# Patient Record
Sex: MALE | URBAN - METROPOLITAN AREA
[De-identification: names, ages, dates, MRNs, and addresses within clinical notes are randomized per-mention and may not be internally consistent; named-entity substitution may affect disease eponyms.]

---

## 2022-10-24 ENCOUNTER — OFFICE VISIT (OUTPATIENT)
Dept: URBAN - METROPOLITAN AREA CLINIC 44 | Facility: CLINIC | Age: 72
End: 2022-10-24
Payer: MEDICARE

## 2022-10-24 DIAGNOSIS — H26.493 OTHER SECONDARY CATARACT, BILATERAL: ICD-10-CM

## 2022-10-24 DIAGNOSIS — D31.31 BENIGN NEOPLASM OF RIGHT CHOROID: ICD-10-CM

## 2022-10-24 DIAGNOSIS — H40.1134 PRIMARY OPEN-ANGLE GLAUCOMA, BILATERAL, INDETERMINATE STAGE: Primary | ICD-10-CM

## 2022-10-24 DIAGNOSIS — H04.123 TEAR FILM INSUFFICIENCY OF BILATERAL LACRIMAL GLANDS: ICD-10-CM

## 2022-10-24 DIAGNOSIS — H43.813 VITREOUS DEGENERATION, BILATERAL: ICD-10-CM

## 2022-10-24 PROCEDURE — 92133 CPTRZD OPH DX IMG PST SGM ON: CPT | Performed by: OPTOMETRIST

## 2022-10-24 PROCEDURE — 99204 OFFICE O/P NEW MOD 45 MIN: CPT | Performed by: OPTOMETRIST

## 2022-10-24 ASSESSMENT — INTRAOCULAR PRESSURE
OD: 16
OS: 20

## 2022-10-24 ASSESSMENT — VISUAL ACUITY
OD: 20/150
OS: 20/20

## 2022-10-24 ASSESSMENT — KERATOMETRY
OS: 42.75
OD: 42.50

## 2022-10-24 NOTE — IMPRESSION/PLAN
Impression: Benign neoplasm of right choroid: D31.31. Appears flat with no suspicious features Plan: PLAN: Observe RTC 4 months for OPTOS

## 2022-10-24 NOTE — IMPRESSION/PLAN
Impression: Primary open-angle glaucoma, bilateral, indeterminate stage: H40.1134. =IOP 16, 20
-Vertical cupping OD Poor scan, OS .74. OS with RNFL loss (S,I,T)  Average OD Poor scan, OS 56,  
-  PACHS  . -Fm Hx.  Plan: PLAN: Cont Combigan but OU BID and RTC 4 months for 24-2 VF and IOP check + OPTOs

## 2022-10-24 NOTE — IMPRESSION/PLAN
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 Visually significant/symptomatic PCO. BCVA OD 20/150, OS 20/20. Glare OD 20/400, OS 20/30. Plan: PLAN: Discussed findings. REC YAG to improve vision. Patient wishes to proceed. RTC for YAG OD, observe OS for now.

## 2022-10-28 ENCOUNTER — SURGERY (OUTPATIENT)
Dept: URBAN - METROPOLITAN AREA SURGERY 19 | Facility: SURGERY | Age: 72
End: 2022-10-28
Payer: MEDICARE

## 2022-10-28 PROCEDURE — 66821 AFTER CATARACT LASER SURGERY: CPT | Performed by: OPHTHALMOLOGY

## 2023-07-21 ENCOUNTER — OFFICE VISIT (OUTPATIENT)
Dept: URBAN - METROPOLITAN AREA CLINIC 52 | Facility: CLINIC | Age: 73
End: 2023-07-21
Payer: COMMERCIAL

## 2023-07-21 DIAGNOSIS — H40.1134 PRIMARY OPEN-ANGLE GLAUCOMA, BILATERAL, INDETERMINATE STAGE: ICD-10-CM

## 2023-07-21 DIAGNOSIS — H26.492 OTHER SECONDARY CATARACT, LEFT EYE: Primary | ICD-10-CM

## 2023-07-21 PROCEDURE — 92133 CPTRZD OPH DX IMG PST SGM ON: CPT | Performed by: OPTOMETRIST

## 2023-07-21 PROCEDURE — 99204 OFFICE O/P NEW MOD 45 MIN: CPT | Performed by: OPTOMETRIST

## 2023-07-21 RX ORDER — LATANOPROST 50 UG/ML
0.005 % SOLUTION OPHTHALMIC
Qty: 5 | Refills: 3 | Status: ACTIVE
Start: 2023-07-21

## 2023-07-21 RX ORDER — BRIMONIDINE TARTRATE AND TIMOLOL MALEATE 2; 5 MG/ML; MG/ML
SOLUTION/ DROPS OPHTHALMIC
Qty: 5 | Refills: 3 | Status: ACTIVE
Start: 2023-07-21

## 2023-07-21 ASSESSMENT — VISUAL ACUITY
OS: 20/80
OD: 20/30

## 2023-07-21 ASSESSMENT — INTRAOCULAR PRESSURE
OD: 34
OS: 26
OD: 33
OS: 22

## 2023-07-21 NOTE — IMPRESSION/PLAN
Impression: Other secondary cataract, left eye: H26.492. Plan: Educated patient on condition and cause of decreased vision. Discussed risks, benefits, and alternatives associated with YAG Capsulotomy, patient demonstrates understanding and would like to proceed. Refer for YAG CAP.

## 2023-07-21 NOTE — IMPRESSION/PLAN
Impression: Primary open-angle glaucoma, bilateral, indeterminate stage: H40.1134. Plan: Patient denies Hx of glaucoma treatment and has not had eye exam in 5 years. OD nerve shows significant cupping on funduscopy. RNFL OCT shows significant RNFL damage OD>OS. *Patient has nasal tube shunt OS - evidence of prior glaucoma diagnosis. Patient has no records from previous eyecare facility. /573 Ordered and reviewed RNFL OCT today Start Latanoprost QHS OU and Brimonidine BID OD. Instilled 2 drops of Simbrinza in office today OU. 

Will have patient RTC in 1-2 wks for HVF 24-2 and IOP check

## 2023-07-31 ENCOUNTER — OFFICE VISIT (OUTPATIENT)
Dept: URBAN - METROPOLITAN AREA CLINIC 52 | Facility: CLINIC | Age: 73
End: 2023-07-31
Payer: MEDICARE

## 2023-07-31 DIAGNOSIS — H40.1134 PRIMARY OPEN-ANGLE GLAUCOMA, INDETERMINATE, BILATERAL: ICD-10-CM

## 2023-07-31 DIAGNOSIS — H26.492 OTHER SECONDARY CATARACT, LEFT EYE: Primary | ICD-10-CM

## 2023-07-31 PROCEDURE — 99214 OFFICE O/P EST MOD 30 MIN: CPT | Performed by: OPHTHALMOLOGY

## 2023-07-31 RX ORDER — LATANOPROST 50 UG/ML
0.005 % SOLUTION OPHTHALMIC
Qty: 0 | Refills: 0 | Status: ACTIVE
Start: 2023-07-31

## 2023-07-31 RX ORDER — BRIMONIDINE TARTRATE 2 MG/ML
0.2 % SOLUTION/ DROPS OPHTHALMIC
Qty: 0 | Refills: 0 | Status: ACTIVE
Start: 2023-07-31

## 2023-07-31 ASSESSMENT — INTRAOCULAR PRESSURE
OS: 19
OD: 13
OD: 19
OS: 24

## 2023-07-31 ASSESSMENT — VISUAL ACUITY
OS: 20/200
OD: 20/40

## 2023-08-29 ENCOUNTER — SURGERY (OUTPATIENT)
Dept: URBAN - METROPOLITAN AREA SURGERY 29 | Facility: LOCATION | Age: 73
End: 2023-08-29
Payer: MEDICARE

## 2023-08-29 ENCOUNTER — SURGERY (OUTPATIENT)
Dept: URBAN - METROPOLITAN AREA SURGERY 28 | Facility: LOCATION | Age: 73
End: 2023-08-29
Payer: MEDICARE

## 2023-08-29 PROCEDURE — 66821 AFTER CATARACT LASER SURGERY: CPT | Performed by: OPHTHALMOLOGY

## 2023-09-06 ENCOUNTER — POST-OPERATIVE VISIT (OUTPATIENT)
Dept: URBAN - METROPOLITAN AREA CLINIC 52 | Facility: CLINIC | Age: 73
End: 2023-09-06
Payer: MEDICARE

## 2023-09-06 DIAGNOSIS — Z48.810 ENCOUNTER FOR SURGICAL AFTERCARE FOLLOWING SURGERY ON A SENSE ORGAN: Primary | ICD-10-CM

## 2023-09-06 PROCEDURE — 99024 POSTOP FOLLOW-UP VISIT: CPT | Performed by: OPTOMETRIST

## 2023-09-06 ASSESSMENT — VISUAL ACUITY
OS: 20/25
OD: 20/30

## 2023-09-06 ASSESSMENT — INTRAOCULAR PRESSURE
OD: 14
OS: 15

## 2023-12-13 ENCOUNTER — OFFICE VISIT (OUTPATIENT)
Dept: URBAN - METROPOLITAN AREA CLINIC 52 | Facility: CLINIC | Age: 73
End: 2023-12-13
Payer: MEDICARE

## 2023-12-13 DIAGNOSIS — H40.1133 PRIMARY OPEN-ANGLE GLAUCOMA, SEVERE STAGE, BILATERAL: Primary | ICD-10-CM

## 2023-12-13 PROCEDURE — 92133 CPTRZD OPH DX IMG PST SGM ON: CPT | Performed by: OPTOMETRIST

## 2023-12-13 PROCEDURE — 99214 OFFICE O/P EST MOD 30 MIN: CPT | Performed by: OPTOMETRIST

## 2023-12-13 PROCEDURE — 92083 EXTENDED VISUAL FIELD XM: CPT | Performed by: OPTOMETRIST

## 2023-12-13 RX ORDER — BRIMONIDINE TARTRATE 2 MG/ML
0.2 % SOLUTION/ DROPS OPHTHALMIC
Qty: 15 | Refills: 3 | Status: ACTIVE
Start: 2023-12-13

## 2023-12-13 ASSESSMENT — INTRAOCULAR PRESSURE
OD: 13
OS: 14